# Patient Record
Sex: FEMALE | Race: WHITE | NOT HISPANIC OR LATINO | Employment: UNEMPLOYED | URBAN - METROPOLITAN AREA
[De-identification: names, ages, dates, MRNs, and addresses within clinical notes are randomized per-mention and may not be internally consistent; named-entity substitution may affect disease eponyms.]

---

## 2017-01-10 ENCOUNTER — TELEPHONE (OUTPATIENT)
Dept: OBSTETRICS AND GYNECOLOGY | Age: 32
End: 2017-01-10

## 2017-01-10 NOTE — TELEPHONE ENCOUNTER
Notify that some irregular bleeding can happen with mirena, we would be happy to see again for evaluation but doesn't have to come in as long as bleeding is light and intermittent

## 2017-01-10 NOTE — TELEPHONE ENCOUNTER
----- Message from Lor Ramos sent at 1/10/2017 10:26 AM EST -----  Dr YASMINE mullins, pt states she had IUD put in about a year ago, was checked in Oct 2016 and everything was ok. Pt started cramping about 3-4 days ago and is having some heavy spotting reddish brown for 2-3 days. Pt is wanting more reassurance that this is ok or does she need to come in for another check. pls call pt at [phone]

## 2017-01-20 ENCOUNTER — PROCEDURE VISIT (OUTPATIENT)
Dept: OBSTETRICS AND GYNECOLOGY | Age: 32
End: 2017-01-20

## 2017-01-20 ENCOUNTER — OFFICE VISIT (OUTPATIENT)
Dept: OBSTETRICS AND GYNECOLOGY | Age: 32
End: 2017-01-20

## 2017-01-20 VITALS
BODY MASS INDEX: 31.89 KG/M2 | SYSTOLIC BLOOD PRESSURE: 124 MMHG | WEIGHT: 180 LBS | HEIGHT: 63 IN | DIASTOLIC BLOOD PRESSURE: 62 MMHG

## 2017-01-20 DIAGNOSIS — N92.6 IRREGULAR BLEEDING: Primary | ICD-10-CM

## 2017-01-20 DIAGNOSIS — N83.202 CYST OF LEFT OVARY: ICD-10-CM

## 2017-01-20 DIAGNOSIS — Z30.431 IUD CHECK UP: Primary | ICD-10-CM

## 2017-01-20 LAB
B-HCG UR QL: NEGATIVE
INTERNAL NEGATIVE CONTROL: NEGATIVE
INTERNAL POSITIVE CONTROL: POSITIVE
Lab: NORMAL

## 2017-01-20 PROCEDURE — 99213 OFFICE O/P EST LOW 20 MIN: CPT | Performed by: PHYSICIAN ASSISTANT

## 2017-01-20 PROCEDURE — 81025 URINE PREGNANCY TEST: CPT | Performed by: PHYSICIAN ASSISTANT

## 2017-01-20 PROCEDURE — 76830 TRANSVAGINAL US NON-OB: CPT | Performed by: PHYSICIAN ASSISTANT

## 2017-01-20 NOTE — PROGRESS NOTES
"Subjective     Chief Complaint   Patient presents with   • Follow-up     irregular bleeding with iud       Waqar Olmedo is a 31 y.o.  whose LMP is Patient's last menstrual period was 2017 (exact date). presents with bleeding.  Has had the IUD for about a year, has not had any bleeding/periods with it.  Started bleeding about a week ago and had mild cramping as well.  She likes the IUD otherwise.  Just wants reassurance that it is in the right place.  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     Objective        Visit Vitals   • /62   • Ht 63\" (160 cm)   • Wt 180 lb (81.6 kg)   • LMP 2017 (Exact Date)   • Breastfeeding No   • BMI 31.89 kg/m2       Physical Exam    General:   alert, comfortable and no distress   Heart: Not performed today   Lungs: Not performed today.   Breast: Not performed today   Neck: na   Abdomen: {Not performed today   CVA: Not performed today   Pelvis: Not performed today   Extremities: Not performed today   Neurologic: negative   Psychiatric: Normal affect, judgement, and mood       Lab Review   Labs: Urine pregnancy test     Imaging   Ultrasound - Pelvic Vaginal    Assessment/Plan     ASSESSMENT  1. Irregular bleeding    2. Cyst of left ovary        PLAN  1.   Orders Placed This Encounter   Procedures   • POC Pregnancy, Urine       2. Pt reassured that the IUD is in the endometrial cavity. She does have an ovarian cyst on the left ovary that measures 4.7 x 3.2 cm.  It is simple in appearance.  She is not sxatic for it.  I did advise pelvic rest and went over ovarian torsion warnings.  She verbalizes understanding and will go to ER with acute pain. Will " repeat u/s in 1 month          Follow up: 4 week(s)    DUYEN Barillas  1/20/2017

## 2017-01-20 NOTE — MR AVS SNAPSHOT
Waqar Olmedo   2017 10:30 AM   Office Visit    Dept Phone:  [phone]   Encounter #:  [id number]    Provider:  DUYEN Eugene   Department:  [facility]                Your Full Care Plan              Your Updated Medication List          This list is accurate as of: 17 11:02 AM.  Always use your most recent med list.                MIRENA (52 MG) 20 MCG/24HR IUD   Generic drug:  levonorgestrel               We Performed the Following     POC Pregnancy, Urine       You Were Diagnosed With        Codes Comments    Irregular bleeding    -  Primary ICD-10-CM: N92.6  ICD-9-CM: 626.4     Cyst of left ovary     ICD-10-CM: N83.202  ICD-9-CM: 620.2       Instructions     None    Patient Instructions History      Upcoming Appointments     Visit Type Date Time Department    GYN FOLLOW UP 2017 10:30 AM [facility]    ULTRASOUND 2017 10:45 AM [facility]    GYN FOLLOW UP 2017 11:30 AM [facility]    ULTRASOUND 2017 11:45 AM [facility]      MyChart Signup     [facility] allows you to send messages to your doctor, view your test results, renew your prescriptions, schedule appointments, and more. To sign up, go to [employer] and click on the Sign Up Now link in the New User? box. Enter your CardLab Activation Code exactly as it appears below along with the last four digits of your Social Security Number and your Date of Birth () to complete the sign-up process. If you do not sign up before the expiration date, you must request a new code.    CardLab Activation Code: [email or web address]  Expires: 2/3/2017 11:02 AM    If you have questions, you can email [email or web address] or call [phone] to talk to our CardLab staff. Remember, CardLab is NOT to be used for urgent needs. For medical emergencies, dial 911.               Other Info from Your Visit           Your  "Appointments     Feb 16, 2017 11:30 AM EST   GYN FOLLOW UP with Shirin Pardo MD   [facility] (--)    [address] [address]   [phone]            Feb 16, 2017 11:45 AM EST   Ultrasound with ULTRASOUND [facility] (--)    [address] [address]   [phone]              Allergies     No Known Allergies      Reason for Visit     Follow-up irregular bleeding with iud      Vital Signs     Blood Pressure Height Weight Last Menstrual Period Breastfeeding? Body Mass Index    124/62 63\" (160 cm) 180 lb (81.6 kg) 01/09/2017 (Exact Date) No 31.89 kg/m2    Smoking Status                   Never Smoker           Problems and Diagnoses Noted     Irregular bleeding    -  Primary    Cyst of left ovary          Results     POC Pregnancy, Urine      Component Value Standard Range & Units    HCG, Urine, QL Negative Negative    Lot Number [id number]     Internal Positive Control Positive     Internal Negative Control Negative                     "

## 2017-01-20 NOTE — MR AVS SNAPSHOT
Waqar Olmedo   2017 10:45 AM   Procedure visit    Dept Phone:  [phone]   Encounter #:  [id number]    Provider:  JUN JAMIL   Department:  [facility]                Your Full Care Plan              Your Updated Medication List          This list is accurate as of: 17 12:16 PM.  Always use your most recent med list.                MIRENA (52 MG) 20 MCG/24HR IUD   Generic drug:  levonorgestrel               We Performed the Following     US Non-ob Transvaginal       You Were Diagnosed With        Codes Comments    IUD check up    -  Primary ICD-10-CM: Z30.431  ICD-9-CM: V25.42       Instructions     None    Patient Instructions History      Upcoming Appointments     Visit Type Date Time Department    GYN FOLLOW UP 2017 10:30 AM [facility]    ULTRASOUND 2017 10:45 AM [facility]    GYN FOLLOW UP 2017 11:30 AM [facility]    [facility]      [employer] Signup     [facility] [employer] allows you to send messages to your doctor, view your test results, renew your prescriptions, schedule appointments, and more. To sign up, go to [employer] and click on the Sign Up Now link in the New User? box. Enter your [employer] Activation Code exactly as it appears below along with the last four digits of your Social Security Number and your Date of Birth () to complete the sign-up process. If you do not sign up before the expiration date, you must request a new code.    [employer] Activation Code: [email or web address]  Expires: 2/3/2017 11:02 AM    If you have questions, you can email [email or web address] or call [phone] to talk to our [employer] staff. Remember, [employer] is NOT to be used for urgent needs. For medical emergencies, dial 911.               Other Info from Your Visit           Your Appointments     2017 11:30 AM EST   GYN FOLLOW UP  with Shirin Pardo MD   [facility] (--)    [address] [address]   [phone]            Feb 16, 2017 11:45 AM EST   Ultrasound with ULTRASOUND [facility] (--)    [address] [address]   [phone]              Allergies     No Known Allergies      Vital Signs     Last Menstrual Period Smoking Status                01/09/2017 (Exact Date) Never Smoker          Problems and Diagnoses Noted     IUD check up    -  Primary

## 2017-02-16 ENCOUNTER — PROCEDURE VISIT (OUTPATIENT)
Dept: OBSTETRICS AND GYNECOLOGY | Age: 32
End: 2017-02-16

## 2017-02-16 ENCOUNTER — OFFICE VISIT (OUTPATIENT)
Dept: OBSTETRICS AND GYNECOLOGY | Age: 32
End: 2017-02-16

## 2017-02-16 VITALS
DIASTOLIC BLOOD PRESSURE: 66 MMHG | BODY MASS INDEX: 32.25 KG/M2 | HEIGHT: 63 IN | SYSTOLIC BLOOD PRESSURE: 110 MMHG | WEIGHT: 182 LBS

## 2017-02-16 DIAGNOSIS — N92.6 IRREGULAR BLEEDING: Primary | ICD-10-CM

## 2017-02-16 DIAGNOSIS — N83.202 CYST OF LEFT OVARY: ICD-10-CM

## 2017-02-16 DIAGNOSIS — N83.202 CYST OF LEFT OVARY: Primary | ICD-10-CM

## 2017-02-16 DIAGNOSIS — N92.6 IRREGULAR BLEEDING: ICD-10-CM

## 2017-02-16 PROCEDURE — 99213 OFFICE O/P EST LOW 20 MIN: CPT | Performed by: OBSTETRICS & GYNECOLOGY

## 2017-02-16 PROCEDURE — 76830 TRANSVAGINAL US NON-OB: CPT | Performed by: OBSTETRICS & GYNECOLOGY

## 2017-02-16 NOTE — PROGRESS NOTES
"GYN Visit    2017    Patient: Waqar Olmedo          MR#:[id number]      Chief Complaint   Patient presents with   • Imaging Only     PT HERE FOR U/S TO RE-CHECK CYST OF LEFT OVARY WITH IRREGULAR BLEEDING.       History of Present Illness    31 y.o. female  who presents for  Follow up ovarian cyst  Bleeding issues have resolved, pt likes mirena  No pelvic pain, due for AE in october        Patient's last menstrual period was 2017 (exact date).    ________________________________________  There is no problem list on file for this patient.      Past Medical History   Diagnosis Date   • Menorrhagia        Past Surgical History   Procedure Laterality Date   •  section         History   Smoking Status   • Never Smoker   Smokeless Tobacco   • Not on file       has a current medication list which includes the following prescription(s): levonorgestrel.  ________________________________________    Current contraception: IUD      The following portions of the patient's history were reviewed and updated as appropriate: allergies, current medications, past family history, past medical history, past social history, past surgical history and problem list.    Review of Systems    Pertinent items are noted in HPI.     Objective   Physical Exam    Visit Vitals   • /66   • Ht 63\" (160 cm)   • Wt 182 lb (82.6 kg)   • LMP 2017 (Exact Date)  Comment: MIRENA   • BMI 32.24 kg/m2      BP Readings from Last 3 Encounters:   17 110/66   17 124/62   10/19/16 120/60      Wt Readings from Last 3 Encounters:   17 182 lb (82.6 kg)   17 180 lb (81.6 kg)   10/19/16 180 lb (81.6 kg)      BMI: Estimated body mass index is 32.24 kg/(m^2) as calculated from the following:    Height as of this encounter: 63\" (160 cm).    Weight as of this encounter: 182 lb (82.6 kg).      General:   alert, appears stated age and cooperative   Abdomen: soft, non-tender, without masses or organomegaly   Breast:  "   Vulva: normal   Vagina: normal mucosa   Cervix: no cervical motion tenderness and no lesions   Uterus: normal size, mobile or non-tender   Adnexa: normal adnexa and no mass, fullness, tenderness     US in office:  See report, essentially normal, left ovarian cyst completely resolved, 2 cm right cyst follicular size, otherwise nl US, mirena in place  Assessment:    Assessment       ICD-10-CM ICD-9-CM   1. Cyst of left ovary N83.202 620.2   2. Irregular bleeding N92.6 626.4     Plan:        Left ovarian cyst resolved  US reassuring  Likes mirena IUD  Follow up AE 10/18

## 2020-09-08 ENCOUNTER — TELEPHONE (OUTPATIENT)
Dept: OBSTETRICS AND GYNECOLOGY | Age: 35
End: 2020-09-08

## 2020-09-08 NOTE — TELEPHONE ENCOUNTER
No CT scan in epic to review, please obtain, may schedule an ER follow up this week or next with me with a gyn US

## 2020-09-08 NOTE — TELEPHONE ENCOUNTER
Patient called, stated that she went to [facility] Saturday to be evaluated. Stated that they has an CT-Scan and discovered that she has ovarian cyst. Pt stated that they advised her to follow up with her OB-GYN    Patient has a new patient appointment with you on 11/16/20, patient is aware that  is out of office until Thursday and she's willing to wait for a response.        [phone]

## 2020-09-17 ENCOUNTER — OFFICE VISIT (OUTPATIENT)
Dept: OBSTETRICS AND GYNECOLOGY | Age: 35
End: 2020-09-17

## 2020-09-17 ENCOUNTER — PROCEDURE VISIT (OUTPATIENT)
Dept: OBSTETRICS AND GYNECOLOGY | Age: 35
End: 2020-09-17

## 2020-09-17 VITALS
SYSTOLIC BLOOD PRESSURE: 110 MMHG | DIASTOLIC BLOOD PRESSURE: 72 MMHG | WEIGHT: 187.6 LBS | HEIGHT: 63 IN | BODY MASS INDEX: 33.24 KG/M2

## 2020-09-17 DIAGNOSIS — N83.209 CYST OF OVARY, UNSPECIFIED LATERALITY: Primary | ICD-10-CM

## 2020-09-17 DIAGNOSIS — N83.202 CYST OF LEFT OVARY: Primary | ICD-10-CM

## 2020-09-17 PROBLEM — R93.2 ABNORMAL CT OF LIVER: Status: ACTIVE | Noted: 2020-09-16

## 2020-09-17 PROBLEM — K76.9 LIVER LESION: Status: ACTIVE | Noted: 2020-09-16

## 2020-09-17 PROBLEM — F17.200 SMOKER: Status: ACTIVE | Noted: 2020-09-16

## 2020-09-17 PROBLEM — R42 VERTIGO: Status: ACTIVE | Noted: 2020-09-16

## 2020-09-17 PROCEDURE — 99203 OFFICE O/P NEW LOW 30 MIN: CPT | Performed by: OBSTETRICS & GYNECOLOGY

## 2020-09-17 PROCEDURE — 76830 TRANSVAGINAL US NON-OB: CPT | Performed by: OBSTETRICS & GYNECOLOGY

## 2020-09-17 RX ORDER — HYDROCODONE BITARTRATE AND ACETAMINOPHEN 5; 325 MG/1; MG/1
TABLET ORAL
COMMUNITY
Start: 2020-09-05 | End: 2020-11-16

## 2020-09-17 RX ORDER — ONDANSETRON 4 MG/1
TABLET, ORALLY DISINTEGRATING ORAL
COMMUNITY
Start: 2020-09-05 | End: 2020-11-16

## 2020-09-17 NOTE — PROGRESS NOTES
"GYN Visit    2020    Patient: Waqar Olmedo          MR#:[id number]      Chief Complaint   Patient presents with   • Follow-up     U/S today.  [facility]'s CT scan showed ovarian cyst. Scan in epic.  Pt last seen in our office in 2016.  Has AE scheduled 2020       History of Present Illness    34 y.o. female  who presents for  ER fu pelvic pain and 1.3 cm ovarian cyst  New pt , hasnt been in for over 3 years  No pain today  Does have menses  Was just after menses when issue occurred ? Ruptured cyst  Feels well today  Has IUD in place  Kids are well, 5,7,11  Has AE and pap scheduled in November          No LMP recorded. (Menstrual status: Other).    ________________________________________  Patient Active Problem List   Diagnosis   • Abnormal CT of liver   • Liver lesion   • Smoker   • Vertigo       Past Medical History:   Diagnosis Date   • Menorrhagia        Past Surgical History:   Procedure Laterality Date   •  SECTION     • CHOLECYSTECTOMY  2018       Social History     Tobacco Use   Smoking Status Never Smoker   Smokeless Tobacco Never Used       has a current medication list which includes the following prescription(s): levonorgestrel, hydrocodone-acetaminophen, and ondansetron odt.  ________________________________________    Current contraception: IUD      The following portions of the patient's history were reviewed and updated as appropriate: allergies, current medications, past family history, past medical history, past social history, past surgical history and problem list.    Review of Systems   Constitutional: Negative for chills, fatigue and fever.   Genitourinary: Negative for menstrual problem and pelvic pain.   Psychiatric/Behavioral: Negative for dysphoric mood.   All other systems reviewed and are negative.      Pertinent items are noted in HPI.     Objective   Physical Exam    /72   Ht 160 cm (63\")   Wt 85.1 kg (187 lb 9.6 oz)   Breastfeeding No   BMI 33.23 " "kg/m²    BP Readings from Last 3 Encounters:   09/17/20 110/72   02/16/17 110/66   01/20/17 124/62      Wt Readings from Last 3 Encounters:   09/17/20 85.1 kg (187 lb 9.6 oz)   02/16/17 82.6 kg (182 lb)   01/20/17 81.6 kg (180 lb)      BMI: Estimated body mass index is 33.23 kg/m² as calculated from the following:    Height as of this encounter: 160 cm (63\").    Weight as of this encounter: 85.1 kg (187 lb 9.6 oz).    Lungs: non labored breathing, no wheezing or tachpnea  Extremities: extremities normal, atraumatic, no cyanosis or edema  Skin: Skin color, texture, turgor normal. No rashes or lesions  Neurologic: Grossly normal  General:   alert, appears stated age and cooperative   Abdomen: soft, non-tender, without masses or organomegaly       Vulva: normal   Vagina: normal mucosa   Cervix: no cervical motion tenderness and no lesions   Uterus: normal size, mobile or non-tender   Adnexa: no mass, fullness, tenderness     Assessment:      Waqar was seen today for follow-up.    Diagnoses and all orders for this visit:    Cyst of ovary, unspecified laterality      See US- normal pelvic US, follicle only 2 cm on right, no evidence cyst or free fluid  Fu AE in 2 months for pap        "

## 2020-11-16 ENCOUNTER — OFFICE VISIT (OUTPATIENT)
Dept: OBSTETRICS AND GYNECOLOGY | Age: 35
End: 2020-11-16

## 2020-11-16 VITALS — HEIGHT: 63 IN | DIASTOLIC BLOOD PRESSURE: 70 MMHG | BODY MASS INDEX: 33.23 KG/M2 | SYSTOLIC BLOOD PRESSURE: 104 MMHG

## 2020-11-16 DIAGNOSIS — Z12.4 PAP SMEAR FOR CERVICAL CANCER SCREENING: ICD-10-CM

## 2020-11-16 DIAGNOSIS — Z11.51 SCREENING FOR HPV (HUMAN PAPILLOMAVIRUS): ICD-10-CM

## 2020-11-16 DIAGNOSIS — Z00.00 ENCOUNTER FOR MEDICAL EXAMINATION TO ESTABLISH CARE: Primary | ICD-10-CM

## 2020-11-16 PROCEDURE — 99395 PREV VISIT EST AGE 18-39: CPT | Performed by: OBSTETRICS & GYNECOLOGY

## 2020-11-16 RX ORDER — MOMETASONE FUROATE 1 MG/G
CREAM TOPICAL
COMMUNITY
Start: 2020-11-11 | End: 2022-11-22

## 2020-11-16 NOTE — PROGRESS NOTES
Routine Annual Visit    2020    Patient: Waqar Olmedo          MR#:[id number]      Chief Complaint   Patient presents with   • [facility] Care     Former patient seen once in Oct. 2016. No complaints.  Hx of ovarian cyst.  Three children 11, 7, and 5.  Two girls and a boy.         History of Present Illness    35 y.o. female  who presents for annual exam.   Doing well, no issues  Does have periods, still unsure what kind of IUD she has, but was told lasts for 5 years  No more pain  rec smoking cessation  Kids on NTI  Pt not currently working  Pap due          Patient's last menstrual period was 10/25/2020.  Obstetric History:  OB History        4    Para   3    Term   3            AB   1    Living   3       SAB        TAB        Ectopic        Molar        Multiple        Live Births   3               Menstrual History:     Patient's last menstrual period was 10/25/2020.       Sexual History:       ________________________________________  Patient Active Problem List   Diagnosis   • Abnormal CT of liver   • Liver lesion   • Smoker   • Vertigo       Past Medical History:   Diagnosis Date   • IUD (intrauterine device) in place      not sure which IUD she had inserted in [facility]   • Menorrhagia        Past Surgical History:   Procedure Laterality Date   •  SECTION     • CHOLECYSTECTOMY  2018       Social History     Tobacco Use   Smoking Status Current Some Day Smoker   • Types: Cigarettes   Smokeless Tobacco Never Used       has a current medication list which includes the following prescription(s): mometasone.  ________________________________________    Current contraception: IUD  History of abnormal Pap smear: no  Family history of Breast cancer: no        The following portions of the patient's history were reviewed and updated as appropriate: allergies, current medications, past family history, past medical history, past social history, past surgical history and problem  "list.    Review of Systems    Pertinent items are noted in HPI.     Objective   Physical Exam    /70   Ht 160 cm (63\")   LMP 10/25/2020   Breastfeeding No   BMI 33.23 kg/m²    BP Readings from Last 3 Encounters:   11/16/20 104/70   09/17/20 110/72   02/16/17 110/66      Wt Readings from Last 3 Encounters:   09/17/20 85.1 kg (187 lb 9.6 oz)   02/16/17 82.6 kg (182 lb)   01/20/17 81.6 kg (180 lb)      BMI: Estimated body mass index is 33.23 kg/m² as calculated from the following:    Height as of this encounter: 160 cm (63\").    Weight as of 9/17/20: 85.1 kg (187 lb 9.6 oz).      General:   alert, appears stated age and cooperative   Abdomen: soft, non-tender, without masses or organomegaly   Breast: inspection negative, no nipple discharge or bleeding, no masses or nodularity palpable   Vulva: normal   Vagina: normal mucosa   Cervix: no cervical motion tenderness, no lesions and blue string present ?kyleena   Uterus: normal size, mobile or non-tender   Adnexa: no mass, fullness, tenderness     Assessment:    1. Normal annual exam   Assessment     ICD-10-CM ICD-9-CM   1. Encounter for medical examination to establish care  Z00.00 V70.9   2. Pap smear for cervical cancer screening  Z12.4 V76.2   3. Screening for HPV (human papillomavirus)  Z11.51 V73.81     Plan:    Plan       [x]  PAP done  []  Labs:   []  GC/Chl/TV          Diagnoses and all orders for this visit:    1. Encounter for medical examination to establish care (Primary)  -     PapIG, HPV, Rfx 16 / 18    2. Pap smear for cervical cancer screening  -     PapIG, HPV, Rfx 16 / 18    3. Screening for HPV (human papillomavirus)  -     PapIG, HPV, Rfx 16 / 18            Counseling:  --Nutrition: Stressed importance of moderation and caloric balance, stressed fresh fruit and vegetables  --Exercise: Stressed the importance of regular exercise. 3-5 times weekly       --Discussed pap smear screening recommendations    "

## 2020-11-19 LAB
CYTOLOGIST CVX/VAG CYTO: NORMAL
CYTOLOGY CVX/VAG DOC CYTO: NORMAL
CYTOLOGY CVX/VAG DOC THIN PREP: NORMAL
DX ICD CODE: NORMAL
HIV 1 & 2 AB SER-IMP: NORMAL
HPV I/H RISK 1 DNA CVX QL PROBE+SIG AMP: NEGATIVE
OTHER STN SPEC: NORMAL
STAT OF ADQ CVX/VAG CYTO-IMP: NORMAL

## 2021-11-18 ENCOUNTER — OFFICE VISIT (OUTPATIENT)
Dept: OBSTETRICS AND GYNECOLOGY | Age: 36
End: 2021-11-18

## 2021-11-18 VITALS
HEIGHT: 63 IN | SYSTOLIC BLOOD PRESSURE: 120 MMHG | WEIGHT: 196 LBS | BODY MASS INDEX: 34.73 KG/M2 | DIASTOLIC BLOOD PRESSURE: 74 MMHG

## 2021-11-18 DIAGNOSIS — Z01.419 ENCOUNTER FOR GYNECOLOGICAL EXAMINATION WITHOUT ABNORMAL FINDING: Primary | ICD-10-CM

## 2021-11-18 PROCEDURE — 99395 PREV VISIT EST AGE 18-39: CPT | Performed by: OBSTETRICS & GYNECOLOGY

## 2021-11-18 PROCEDURE — 3008F BODY MASS INDEX DOCD: CPT | Performed by: OBSTETRICS & GYNECOLOGY

## 2022-11-22 ENCOUNTER — OFFICE VISIT (OUTPATIENT)
Dept: OBSTETRICS AND GYNECOLOGY | Age: 37
End: 2022-11-22

## 2022-11-22 VITALS
WEIGHT: 215.4 LBS | HEIGHT: 63 IN | DIASTOLIC BLOOD PRESSURE: 74 MMHG | BODY MASS INDEX: 38.16 KG/M2 | SYSTOLIC BLOOD PRESSURE: 132 MMHG

## 2022-11-22 DIAGNOSIS — Z01.419 ENCOUNTER FOR GYNECOLOGICAL EXAMINATION WITHOUT ABNORMAL FINDING: Primary | ICD-10-CM

## 2022-11-22 PROCEDURE — 3008F BODY MASS INDEX DOCD: CPT | Performed by: OBSTETRICS & GYNECOLOGY

## 2022-11-22 PROCEDURE — 99395 PREV VISIT EST AGE 18-39: CPT | Performed by: OBSTETRICS & GYNECOLOGY

## 2022-11-22 NOTE — PROGRESS NOTES
Routine Annual Visit    2022    Patient: Waqar Olmedo          MR#:[id number]      Chief Complaint   Patient presents with   • Annual Exam     AE today, Last AE 2021, Last pap 2020 neg, Copper IUD placed , No problems today       History of Present Illness    37 y.o. female  who presents for annual exam.   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  Obstetric History:  OB History        4    Para   3    Term   3            AB   1    Living   3       SAB        IAB        Ectopic        Molar        Multiple        Live Births   3               Menstrual History:     Patient's last menstrual period was 2022.       Sexual History:       ________________________________________  Patient Active Problem List   Diagnosis   • Abnormal CT of liver   • Liver lesion   • Smoker   • Vertigo       Past Medical History:   Diagnosis Date   • IUD (intrauterine device) in place 2019     not sure which IUD she had inserted in [facility]   • Menorrhagia        Past Surgical History:   Procedure Laterality Date   •  SECTION      x3   • CHOLECYSTECTOMY  2018       Social History     Tobacco Use   Smoking Status Some Days   • Types: Cigarettes   Smokeless Tobacco Never       currently has no medications in their medication list.  ________________________________________    Current contraception: IUD  History of abnormal Pap smear: no  Family history of Breast cancer: no        The following portions of the patient's history were reviewed and updated as appropriate: allergies, current medications, past family history, past medical history, past social history, past surgical history and problem list.    Review of Systems    Pertinent items are noted in HPI.  "    Objective   Physical Exam    /74   Ht 160 cm (63\")   Wt 97.7 kg (215 lb 6.4 oz)   LMP 11/22/2022 Comment: Copper IUD 2019  Breastfeeding No   BMI 38.16 kg/m²    BP Readings from Last 3 Encounters:   11/22/22 132/74   11/18/21 120/74   11/16/20 104/70      Wt Readings from Last 3 Encounters:   11/22/22 97.7 kg (215 lb 6.4 oz)   11/18/21 88.9 kg (196 lb)   09/17/20 85.1 kg (187 lb 9.6 oz)      BMI: Estimated body mass index is 38.16 kg/m² as calculated from the following:    Height as of this encounter: 160 cm (63\").    Weight as of this encounter: 97.7 kg (215 lb 6.4 oz).      General:   alert, appears stated age and cooperative   Abdomen: soft, non-tender, without masses or organomegaly   Breast: inspection negative, no nipple discharge or bleeding, no masses or nodularity palpable   Vulva: normal   Vagina: normal mucosa   Cervix: no cervical motion tenderness and no lesions   Uterus: normal size, mobile and non-tender   Adnexa: no mass, fullness, tenderness     Assessment:    1. Normal annual exam   Assessment     ICD-10-CM ICD-9-CM   1. Encounter for gynecological examination without abnormal finding  Z01.419 V72.31     Plan:    Plan       []  PAP done  []  Labs:   []  GC/Chl/TV          Diagnoses and all orders for this visit:    1. Encounter for gynecological examination without abnormal finding (Primary)            Counseling:  --Nutrition: Stressed importance of moderation and caloric balance, stressed fresh fruit and vegetables  --Exercise: Stressed the importance of regular exercise. 3-5 times weekly       --Discussed pap smear screening recommendations    "

## 2024-01-01 NOTE — PROGRESS NOTES
"Routine Annual Visit    2021    Patient: Waqar Olmedo          MR#:[id number]      Chief Complaint   Patient presents with   • Gynecologic Exam     Annual:last pap        History of Present Illness    36 y.o. female  who presents for annual exam.   Menses regular , no issues  Has a copper IUD called novaplus- 380 T CU- placed in 2019  Kids well, 6,8,12  Works at [employer]  Gets 14,000 steps daily          Patient's last menstrual period was 2021 (approximate).  Obstetric History:  OB History        4    Para   3    Term   3            AB   1    Living   3       SAB        IAB        Ectopic        Molar        Multiple        Live Births   3               Menstrual History:     Patient's last menstrual period was 2021 (approximate).       Sexual History:       ________________________________________  Patient Active Problem List   Diagnosis   • Abnormal CT of liver   • Liver lesion   • Smoker   • Vertigo       Past Medical History:   Diagnosis Date   • IUD (intrauterine device) in place 2019     not sure which IUD she had inserted in [facility]   • Menorrhagia        Past Surgical History:   Procedure Laterality Date   •  SECTION     • CHOLECYSTECTOMY  2018       Social History     Tobacco Use   Smoking Status Current Some Day Smoker   • Types: Cigarettes   Smokeless Tobacco Never Used       has a current medication list which includes the following prescription(s): mometasone.  ________________________________________    Current contraception: IUD  History of abnormal Pap smear: no  Family history of Breast cancer: no        The following portions of the patient's history were reviewed and updated as appropriate: allergies, current medications, past family history, past medical history, past social history, past surgical history and problem list.    Review of Systems    Pertinent items are noted in HPI.     Objective   Physical Exam    /74   Ht 160 cm (63\")   Wt " "88.9 kg (196 lb)   LMP 11/08/2021 (Approximate)   Breastfeeding No   BMI 34.72 kg/m²    BP Readings from Last 3 Encounters:   11/18/21 120/74   11/16/20 104/70   09/17/20 110/72      Wt Readings from Last 3 Encounters:   11/18/21 88.9 kg (196 lb)   09/17/20 85.1 kg (187 lb 9.6 oz)   02/16/17 82.6 kg (182 lb)      BMI: Estimated body mass index is 34.72 kg/m² as calculated from the following:    Height as of this encounter: 160 cm (63\").    Weight as of this encounter: 88.9 kg (196 lb).      General:   alert, appears stated age and cooperative   Abdomen: soft, non-tender, without masses or organomegaly   Breast: inspection negative, no nipple discharge or bleeding, no masses or nodularity palpable   Vulva: normal   Vagina: normal mucosa   Cervix: no cervical motion tenderness and no lesions   Uterus: normal size, mobile and non-tender   Adnexa: no mass, fullness, tenderness     Assessment:    1. Normal annual exam   Assessment     ICD-10-CM ICD-9-CM   1. Encounter for gynecological examination without abnormal finding  Z01.419 V72.31     Plan:    Plan       []  PAP done  []  Labs:   []  GC/Chl/TV          Diagnoses and all orders for this visit:    1. Encounter for gynecological examination without abnormal finding (Primary)            Counseling:  --Nutrition: Stressed importance of moderation and caloric balance, stressed fresh fruit and vegetables  --Exercise: Stressed the importance of regular exercise. 3-5 times weekly       --Discussed pap smear screening recommendations    " show

## 2024-04-05 ENCOUNTER — OFFICE VISIT (OUTPATIENT)
Dept: OBSTETRICS AND GYNECOLOGY | Age: 39
End: 2024-04-05
Payer: COMMERCIAL

## 2024-04-05 VITALS
DIASTOLIC BLOOD PRESSURE: 72 MMHG | BODY MASS INDEX: 35.26 KG/M2 | HEIGHT: 63 IN | SYSTOLIC BLOOD PRESSURE: 110 MMHG | WEIGHT: 199 LBS

## 2024-04-05 DIAGNOSIS — Z97.5 IUD (INTRAUTERINE DEVICE) IN PLACE: ICD-10-CM

## 2024-04-05 DIAGNOSIS — Z01.419 WELL WOMAN EXAM WITH ROUTINE GYNECOLOGICAL EXAM: Primary | ICD-10-CM

## 2024-04-05 DIAGNOSIS — Z13.9 SPECIAL SCREENING: ICD-10-CM

## 2024-04-05 DIAGNOSIS — Z12.4 SCREENING FOR CERVICAL CANCER: ICD-10-CM

## 2024-04-05 LAB
B-HCG UR QL: NEGATIVE
EXPIRATION DATE: NORMAL
INTERNAL NEGATIVE CONTROL: NEGATIVE
INTERNAL POSITIVE CONTROL: NORMAL
Lab: NORMAL

## 2024-04-05 NOTE — PROGRESS NOTES
Subjective     Chief Complaint   Patient presents with   • Gynecologic Exam     Cc: annual visit today last pap 20 neg ,Has a copper IUD periods irregular arriving every 2 weeks , IUD is probably  .   No other problems        History of Present Illness    Waqar Olmedo is a 38 y.o.  who presents for annual exam.    Doing well  Has IUD similar to Paragard IUD for contraception[place] - pt thinks this is past due to come out. Wants to replace.  Her menses are irregular coming every 2 weeks    Obstetric History:  OB History          4    Para   3    Term   3            AB   1    Living   3         SAB        IAB        Ectopic        Molar        Multiple        Live Births   3               Menstrual History:     Patient's last menstrual period was 2024 (approximate).         Current contraception: IUD  History of abnormal Pap smear: no  Received Gardasil immunization: no  Perform regular self breast exam: yes - .  Family history of uterine or ovarian cancer: no  Family History of colon cancer: no  Family history of breast cancer: no    Mammogram: not indicated.  Colonoscopy: not indicated.  DEXA: not indicated.    Exercise: moderately active  Calcium/Vitamin D: adequate intake    The following portions of the patient's history were reviewed and updated as appropriate: allergies, current medications, past family history, past medical history, past social history, past surgical history, and problem list.    Review of Systems   Constitutional: Negative.    Respiratory: Negative.     Cardiovascular: Negative.    Gastrointestinal: Negative.    Genitourinary: Negative.    Skin: Negative.    Psychiatric/Behavioral: Negative.             Objective   Physical Exam  Constitutional:       General: She is awake.      Appearance: Normal appearance. She is well-developed. She is obese.   HENT:      Head: Normocephalic and atraumatic.      Nose: Nose normal.   Neck:       Thyroid: No thyroid mass, thyromegaly or thyroid tenderness.   Cardiovascular:      Rate and Rhythm: Normal rate and regular rhythm.      Pulses: Normal pulses.      Heart sounds: Normal heart sounds.   Pulmonary:      Effort: Pulmonary effort is normal.      Breath sounds: Normal breath sounds.   Chest:   Breasts:     Breasts are symmetrical.      Right: Normal. No swelling, bleeding, inverted nipple, mass, nipple discharge, skin change or tenderness.      Left: Normal. No swelling, bleeding, inverted nipple, mass, nipple discharge, skin change or tenderness.   Abdominal:      General: Abdomen is flat. Bowel sounds are normal.      Palpations: Abdomen is soft.      Tenderness: There is no abdominal tenderness.   Genitourinary:     General: Normal vulva.      Labia:         Right: No rash, tenderness, lesion or injury.         Left: No rash, tenderness, lesion or injury.       Urethra: No prolapse, urethral pain, urethral swelling or urethral lesion.      Vagina: Normal. No signs of injury. No vaginal discharge, erythema, tenderness, bleeding, lesions or prolapsed vaginal walls.      Cervix: Normal. No discharge, friability, lesion, erythema or cervical bleeding.      Uterus: Normal. Not enlarged, not tender and no uterine prolapse.       Adnexa: Right adnexa normal and left adnexa normal.        Right: No mass, tenderness or fullness.          Left: No mass, tenderness or fullness.        Rectum: Normal. No mass.      Comments: IUD strings seen  Musculoskeletal:      Cervical back: Normal range of motion and neck supple.   Lymphadenopathy:      Upper Body:      Right upper body: No supraclavicular adenopathy.      Left upper body: No supraclavicular adenopathy.   Skin:     General: Skin is warm and dry.   Neurological:      General: No focal deficit present.      Mental Status: She is alert and oriented to person, place, and time.   Psychiatric:         Mood and Affect: Mood normal.         Behavior: Behavior  "normal. Behavior is cooperative.         Thought Content: Thought content normal.         Judgment: Judgment normal.       /72   Ht 160 cm (63\")   Wt 90.3 kg (199 lb)   LMP 03/29/2024 (Approximate)   BMI 35.25 kg/m²     Assessment & Plan   Diagnoses and all orders for this visit:    1. Well woman exam with routine gynecological exam (Primary)    2. Screening for cervical cancer  -     IGP, Apt HPV,rfx 16 / 18,45    3. IUD (intrauterine device) in place        All questions answered.  Breast self exam technique reviewed and patient encouraged to perform self-exam monthly.  Discussed healthy lifestyle modifications.  Recommended 30 minutes of aerobic exercise five times per week.  Discussed calcium needs to prevent osteoporosis.    -Pap done  -Pt will sign PW for new paragard. Plan removal and replacement in a couple of weeks.               "

## 2024-04-10 LAB
CYTOLOGIST CVX/VAG CYTO: NORMAL
CYTOLOGY CVX/VAG DOC CYTO: NORMAL
CYTOLOGY CVX/VAG DOC THIN PREP: NORMAL
DX ICD CODE: NORMAL
HPV I/H RISK 4 DNA CVX QL PROBE+SIG AMP: NEGATIVE
Lab: NORMAL
OTHER STN SPEC: NORMAL
STAT OF ADQ CVX/VAG CYTO-IMP: NORMAL

## 2024-05-09 ENCOUNTER — OFFICE VISIT (OUTPATIENT)
Dept: OBSTETRICS AND GYNECOLOGY | Age: 39
End: 2024-05-09
Payer: COMMERCIAL

## 2024-05-09 VITALS
HEIGHT: 63 IN | BODY MASS INDEX: 34.73 KG/M2 | DIASTOLIC BLOOD PRESSURE: 74 MMHG | SYSTOLIC BLOOD PRESSURE: 116 MMHG | WEIGHT: 196 LBS

## 2024-05-09 DIAGNOSIS — Z30.430 ENCOUNTER FOR IUD INSERTION: ICD-10-CM

## 2024-05-09 DIAGNOSIS — Z30.430 ENCOUNTER FOR INSERTION OF PARAGARD IUD: Primary | ICD-10-CM

## 2024-05-09 LAB
B-HCG UR QL: NEGATIVE
EXPIRATION DATE: NORMAL
INTERNAL NEGATIVE CONTROL: NORMAL
INTERNAL POSITIVE CONTROL: NORMAL
Lab: NORMAL

## 2024-05-09 RX ORDER — COPPER 313.4 MG/1
INTRAUTERINE DEVICE INTRAUTERINE
COMMUNITY
Start: 2024-04-09

## 2024-05-09 RX ORDER — AMOXICILLIN AND CLAVULANATE POTASSIUM 875; 125 MG/1; MG/1
1 TABLET, FILM COATED ORAL
COMMUNITY
Start: 2024-05-08 | End: 2024-05-15

## 2024-05-09 RX ORDER — FLUTICASONE PROPIONATE 50 MCG
1 SPRAY, SUSPENSION (ML) NASAL DAILY
COMMUNITY
Start: 2024-05-08 | End: 2024-06-07

## 2024-06-10 ENCOUNTER — OFFICE VISIT (OUTPATIENT)
Dept: OBSTETRICS AND GYNECOLOGY | Age: 39
End: 2024-06-10
Payer: COMMERCIAL

## 2024-06-10 VITALS
DIASTOLIC BLOOD PRESSURE: 80 MMHG | BODY MASS INDEX: 35.44 KG/M2 | HEIGHT: 63 IN | WEIGHT: 200 LBS | SYSTOLIC BLOOD PRESSURE: 122 MMHG

## 2024-06-10 DIAGNOSIS — Z30.431 IUD CHECK UP: Primary | ICD-10-CM

## 2024-06-10 PROCEDURE — 99212 OFFICE O/P EST SF 10 MIN: CPT | Performed by: OBSTETRICS & GYNECOLOGY

## 2024-06-10 NOTE — PROGRESS NOTES
"GYN Visit    6/10/2024    Patient: Waqar Olmedo          MR#:[id number]      Chief Complaint   Patient presents with    Follow-up     Gyn F/u - IUD string check, Pt c/o spotting since insertion       History of Present Illness    38 y.o. female  who presents for IUD check    She has had some irregular bleeding and spotting  Consistent with the usual IUD side effects  Reassured patient that this will fade away  Otherwise she has had no problems        Patient's last menstrual period was 2024 (approximate).    ________________________________________  Patient Active Problem List   Diagnosis    Abnormal CT of liver    Liver lesion    Smoker    Vertigo       Past Medical History:   Diagnosis Date    IUD (intrauterine device) in place 2019     not sure which IUD she had inserted in [facility]    Menorrhagia        Past Surgical History:   Procedure Laterality Date     SECTION      x3    CHOLECYSTECTOMY  2018       Social History     Tobacco Use   Smoking Status Some Days    Current packs/day: 0.25    Average packs/day: 0.3 packs/day for 3.0 years (0.8 ttl pk-yrs)    Types: Cigarettes   Smokeless Tobacco Never       has a current medication list which includes the following prescription(s): fluticasone, levonorgestrel, and paragard intrauterine copper.  ________________________________________    Current contraception: IUD      The following portions of the patient's history were reviewed and updated as appropriate: allergies, current medications, past family history, past medical history, past social history, past surgical history, and problem list.    Review of Systems    Pertinent items are noted in HPI.     Objective   Physical Exam    /80   Ht 160 cm (63\")   Wt 90.7 kg (200 lb)   LMP 2024 (Approximate)   BMI 35.43 kg/m²    BP Readings from Last 3 Encounters:   06/10/24 122/80   24 116/74   24 110/72      Wt Readings from Last 3 Encounters:   06/10/24 90.7 kg (200 lb) " "  05/09/24 88.9 kg (196 lb)   04/05/24 90.3 kg (199 lb)      BMI: Estimated body mass index is 35.43 kg/m² as calculated from the following:    Height as of this encounter: 160 cm (63\").    Weight as of this encounter: 90.7 kg (200 lb).    Lungs: non labored breathing, no wheezing or tachpnea  Extremities: extremities normal, atraumatic, no cyanosis or edema  Skin: Skin color, texture, turgor normal. No rashes or lesions  Neurologic: Grossly normal  General:   alert, appears stated age, and cooperative   Abdomen: soft, non-tender, without masses or organomegaly       Vulva: normal   Vagina: normal mucosa   Cervix: no cervical motion tenderness, no lesions, and IUD string visible and in good position             Assessment:      Diagnoses and all orders for this visit:    1. IUD check up (Primary)    Doing well with the IUD follow-up 1 year and as needed          "

## 2024-08-30 ENCOUNTER — OFFICE VISIT (OUTPATIENT)
Dept: OBSTETRICS AND GYNECOLOGY | Age: 39
End: 2024-08-30
Payer: COMMERCIAL

## 2024-08-30 VITALS
DIASTOLIC BLOOD PRESSURE: 74 MMHG | HEIGHT: 63 IN | WEIGHT: 205 LBS | SYSTOLIC BLOOD PRESSURE: 116 MMHG | BODY MASS INDEX: 36.32 KG/M2

## 2024-08-30 DIAGNOSIS — Z30.432 ENCOUNTER FOR IUD REMOVAL: Primary | ICD-10-CM

## 2024-08-30 RX ORDER — ACETAMINOPHEN AND CODEINE PHOSPHATE 120; 12 MG/5ML; MG/5ML
1 SOLUTION ORAL DAILY
Qty: 84 TABLET | Refills: 3 | Status: SHIPPED | OUTPATIENT
Start: 2024-08-30 | End: 2025-08-30

## 2024-08-30 NOTE — PROGRESS NOTES
"GYN Visit    2024    Patient: Waqar Olmedo          MR#:[id number]      Chief Complaint   Patient presents with    Follow-up     Gyn F/u - IUD removal       History of Present Illness    38 y.o. female  who presents for IUD removal    She continues to have light bleeding with the IUD  She would like to go on a pill  Unfortunately she does vape  I highly recommend smoking cessation  We discussed the minipill  We will try this  I also discussed vasectomy for her  and gave her a referral  Patient has [employer] insurance and may have to change her GYN provider  I did give her a referral name          No LMP recorded. Patient has had an implant.    ________________________________________  Patient Active Problem List   Diagnosis    Abnormal CT of liver    Liver lesion    Smoker    Vertigo       Past Medical History:   Diagnosis Date    IUD (intrauterine device) in place 2019     not sure which IUD she had inserted in [facility]    Menorrhagia        Past Surgical History:   Procedure Laterality Date     SECTION      x3    CHOLECYSTECTOMY  2018       Social History     Tobacco Use   Smoking Status Some Days    Current packs/day: 0.25    Average packs/day: 0.3 packs/day for 3.0 years (0.8 ttl pk-yrs)    Types: Cigarettes   Smokeless Tobacco Never       has a current medication list which includes the following prescription(s): fluticasone and norethindrone.  ________________________________________    Current contraception: oral progesterone-only contraceptive      The following portions of the patient's history were reviewed and updated as appropriate: allergies, current medications, past family history, past medical history, past social history, past surgical history, and problem list.    Review of Systems    Pertinent items are noted in HPI.     Objective   Physical Exam    /74   Ht 160 cm (63\")   Wt 93 kg (205 lb)   BMI 36.31 kg/m²    BP Readings from Last 3 Encounters:   24 " "116/74   06/10/24 122/80   05/09/24 116/74      Wt Readings from Last 3 Encounters:   08/30/24 93 kg (205 lb)   06/10/24 90.7 kg (200 lb)   05/09/24 88.9 kg (196 lb)      BMI: Estimated body mass index is 36.31 kg/m² as calculated from the following:    Height as of this encounter: 160 cm (63\").    Weight as of this encounter: 93 kg (205 lb).    Procedures  IUD Removal Procedure Note    Type of IUD:  Kyleena   Date of insertion:  known  Reason for removal:  Side effect: bleeding  Other relevant history/information:  none    Procedure Time Documentation  The risks of the procedure were reviewed with the patient including bleeding, infection and unlikely damage to the uterus and the benefits of the procedure were explained to the patient and Written informed consent was obtained    Procedure Details  IUD strings visible:  yes  Local anesthesia:  None  Tenaculum used:  None  Removal:  IUD strings grasped and IUD removed intact with gentle traction.  The patient tolerated the procedure well.    All appropriate instructions regarding removal were reviewed.    Patient tolerated the procedure well without complications.    Plans for contraception:  oral progesterone-only contraceptive    Other follow-up needed:  none    The patient was advised to call for any fever or for prolonged or severe pain or bleeding. She was advised to use OTC ibuprofen as needed for mild to moderate pain.     Shirin Rai MD  8/30/2024  13:36 EDT      Assessment:      Diagnoses and all orders for this visit:    1. Encounter for IUD removal (Primary)    Other orders  -     norethindrone (MICRONOR) 0.35 MG tablet; Take 1 tablet by mouth Daily.  Dispense: 84 tablet; Refill: 3      Follow-up annual exam and as needed          "

## 2024-11-12 NOTE — TELEPHONE ENCOUNTER
Caller: Waqar Olmedo    Relationship: Self    Best call back number: [phone] (home)       Requested Prescriptions:   Requested Prescriptions     Pending Prescriptions Disp Refills    norethindrone (MICRONOR) 0.35 MG tablet 84 tablet 3     Sig: Take 1 tablet by mouth Daily.        Pharmacy where request should be sent: [place] [id number] [address] [facility] [phone] [facility] [phone]      Last office visit with prescribing clinician: 8/30/2024   Last telemedicine visit with prescribing clinician: Visit date not found   Next office visit with prescribing clinician: 6/16/2025       Does the patient have less than a 3 day supply:  [] Yes  [x] No    Would you like a call back once the refill request has been completed: [x] Yes [] No    If the office needs to give you a call back, can they leave a voicemail: [x] Yes [] No    Beatrice De Anda   11/12/24 11:38 EST

## 2025-02-03 RX ORDER — ACETAMINOPHEN AND CODEINE PHOSPHATE 120; 12 MG/5ML; MG/5ML
1 SOLUTION ORAL DAILY
Qty: 84 TABLET | Refills: 3 | Status: SHIPPED | OUTPATIENT
Start: 2025-02-03 | End: 2026-02-03

## 2025-06-16 ENCOUNTER — OFFICE VISIT (OUTPATIENT)
Dept: OBSTETRICS AND GYNECOLOGY | Age: 40
End: 2025-06-16
Payer: COMMERCIAL

## 2025-06-16 VITALS
DIASTOLIC BLOOD PRESSURE: 76 MMHG | WEIGHT: 213 LBS | SYSTOLIC BLOOD PRESSURE: 116 MMHG | HEIGHT: 63 IN | BODY MASS INDEX: 37.74 KG/M2

## 2025-06-16 DIAGNOSIS — Z12.31 ENCOUNTER FOR SCREENING MAMMOGRAM FOR MALIGNANT NEOPLASM OF BREAST: ICD-10-CM

## 2025-06-16 DIAGNOSIS — Z01.419 ENCOUNTER FOR GYNECOLOGICAL EXAMINATION WITHOUT ABNORMAL FINDING: Primary | ICD-10-CM

## 2025-06-16 RX ORDER — ACETAMINOPHEN AND CODEINE PHOSPHATE 120; 12 MG/5ML; MG/5ML
1 SOLUTION ORAL DAILY
Qty: 84 TABLET | Refills: 3 | Status: SHIPPED | OUTPATIENT
Start: 2025-06-16 | End: 2026-06-16

## 2025-06-16 NOTE — PROGRESS NOTES
Routine Annual Visit    2025    Patient: Waqar Olmedo          MR#:[id number]      Chief Complaint   Patient presents with    Gynecologic Exam     Annual Exam - last pap 24 neg, Pt stating her periods have started to become irregular otherwise doing well       History of Present Illness    39 y.o. female  who presents for annual exam.     Patient is feeling well  She is taking a progestin only pill  Her periods have been regular until most recently when she had a period that happened late  Her Pap is up-to-date  Mammogram will be due in November  We discussed the progestin only pill  The patient is actively vaping  She is interested in a bilateral salpingectomy  Unfortunately she has [employer] based insurance  I will give her a referral to GYN at [facility] that can do her surgery if she would like to pursue this  No other complaints today      Patient's last menstrual period was 06/15/2025 (exact date).  Obstetric History:  OB History          4    Para   3    Term   3            AB   1    Living   3         SAB        IAB        Ectopic        Molar        Multiple        Live Births   3               Menstrual History:     Patient's last menstrual period was 06/15/2025 (exact date).       Sexual History:       ________________________________________  Patient Active Problem List   Diagnosis    Abnormal CT of liver    Liver lesion    Smoker    Vertigo       Past Medical History:   Diagnosis Date    IUD (intrauterine device) in place      not sure which IUD she had inserted in [facility]    Menorrhagia        Past Surgical History:   Procedure Laterality Date     SECTION      x3    CHOLECYSTECTOMY  2018    LAPAROSCOPIC CHOLECYSTECTOMY  10/2018       Social History     Tobacco Use   Smoking Status Former    Current packs/day: 0.25    Average packs/day: 0.3 packs/day for 3.0 years (0.8 ttl pk-yrs)    Types: Cigarettes   Smokeless Tobacco Never       has a current medication list  "which includes the following prescription(s): norethindrone and fluticasone.  ________________________________________    Current contraception: oral progesterone-only contraceptive  History of abnormal Pap smear: no  Family history of Breast cancer: no        The following portions of the patient's history were reviewed and updated as appropriate: allergies, current medications, past family history, past medical history, past social history, past surgical history, and problem list.    Review of Systems    Pertinent items are noted in HPI.     Objective   Physical Exam    /76 (BP Location: Left arm, Patient Position: Sitting)   Ht 160 cm (63\")   Wt 96.6 kg (213 lb)   LMP 06/15/2025 (Exact Date)   BMI 37.73 kg/m²    BP Readings from Last 3 Encounters:   06/16/25 116/76   08/30/24 116/74   06/10/24 122/80      Wt Readings from Last 3 Encounters:   06/16/25 96.6 kg (213 lb)   08/30/24 93 kg (205 lb)   06/10/24 90.7 kg (200 lb)      BMI: Estimated body mass index is 37.73 kg/m² as calculated from the following:    Height as of this encounter: 160 cm (63\").    Weight as of this encounter: 96.6 kg (213 lb).      General:   alert, appears stated age, and cooperative   Abdomen: soft, non-tender, without masses or organomegaly   Breast: inspection negative, no nipple discharge or bleeding, no masses or nodularity palpable   Vulva: normal, Bartholin's, Urethra, [place]'s normal   Vagina: normal mucosa   Cervix: no cervical motion tenderness and no lesions   Uterus: normal size, mobile, and non-tender   Adnexa: no mass, fullness, tenderness     Assessment:    1. Normal annual exam   Assessment     ICD-10-CM ICD-9-CM   1. Encounter for gynecological examination without abnormal finding  Z01.419 V72.31   2. Encounter for screening mammogram for malignant neoplasm of breast  Z12.31 V76.12     Plan:    Plan       []  PAP done  []  Labs:   []  GC/Chl/TV          Diagnoses and all orders for this visit:    1. Encounter for " gynecological examination without abnormal finding (Primary)    2. Encounter for screening mammogram for malignant neoplasm of breast  -     Mammo screening digital tomosynthesis bilateral w CAD; Future    Other orders  -     norethindrone (MICRONOR) 0.35 MG tablet; Take 1 tablet by mouth Daily.  Dispense: 84 tablet; Refill: 3            Counseling:  --Nutrition: Stressed importance of moderation and caloric balance, stressed fresh fruit and vegetables  --Exercise: Stressed the importance of regular exercise. 3-5 times weekly       --Discussed pap smear screening recommendations